# Patient Record
Sex: MALE | Race: BLACK OR AFRICAN AMERICAN | NOT HISPANIC OR LATINO | Employment: FULL TIME | URBAN - METROPOLITAN AREA
[De-identification: names, ages, dates, MRNs, and addresses within clinical notes are randomized per-mention and may not be internally consistent; named-entity substitution may affect disease eponyms.]

---

## 2021-10-29 ENCOUNTER — OFFICE VISIT (OUTPATIENT)
Dept: ORTHOPEDIC SURGERY | Facility: CLINIC | Age: 31
End: 2021-10-29

## 2021-10-29 VITALS — WEIGHT: 252 LBS | TEMPERATURE: 98 F | HEIGHT: 65 IN | BODY MASS INDEX: 41.99 KG/M2

## 2021-10-29 DIAGNOSIS — R52 PAIN: Primary | ICD-10-CM

## 2021-10-29 DIAGNOSIS — M76.62 ACHILLES TENDINITIS OF LEFT LOWER EXTREMITY: ICD-10-CM

## 2021-10-29 PROCEDURE — 73630 X-RAY EXAM OF FOOT: CPT | Performed by: ORTHOPAEDIC SURGERY

## 2021-10-29 PROCEDURE — 99203 OFFICE O/P NEW LOW 30 MIN: CPT | Performed by: ORTHOPAEDIC SURGERY

## 2021-10-29 RX ORDER — MELOXICAM 15 MG/1
15 TABLET ORAL DAILY PRN
Qty: 30 TABLET | Refills: 2 | Status: SHIPPED | OUTPATIENT
Start: 2021-10-29

## 2021-10-29 NOTE — PROGRESS NOTES
Patient: Jose J Lewis    YOB: 1990    Medical Record Number: [id number]    Chief Complaints: Left ankle pain    History of Present Illness:     31 y.o. male patient who presents for his left ankle.  It started bothering him a few months ago.  He does not recall any injury.  He especially notices the pain in the morning when he first gets up.  He also experiences pain when he is seated for prolonged period of time and then stands.  As he gets walking and moving it does seem to loosen up.  Localizes the pain to the back of his heel.  He was given a boot by the  at [place].  The boot does seem to help.    Allergies: Not on File    Home Medications:      Current Outpatient Medications:   •  meloxicam (MOBIC) 15 MG tablet, Take 1 tablet by mouth Daily As Needed for Moderate Pain . Take as directed with food., Disp: 30 tablet, Rfl: 2    History reviewed. No pertinent past medical history.    No past surgical history on file.    Social History     Occupational History   • Not on file   Tobacco Use   • Smoking status: Not on file   • Smokeless tobacco: Not on file   Substance and Sexual Activity   • Alcohol use: Not on file   • Drug use: Not on file   • Sexual activity: Not on file      Social History     Social History Narrative   • Not on file       History reviewed. No pertinent family history.    Review of Systems:      Constitutional: Denies fever, shaking or chills   Eyes: Denies change in visual acuity   HEENT: Denies nasal congestion or sore throat   Respiratory: Denies cough or shortness of breath   Cardiovascular: Denies chest pain or edema  Endocrine: Denies tremors, palpitations, intolerance of heat or cold, polyuria, polydipsia.  GI: Denies abdominal pain, nausea, vomiting, bloody stools or diarrhea  : Denies frequency, urgency, incontinence, retention, or nocturia.  Musculoskeletal: Denies numbness, tingling or loss of motor function except as above  Integument: Denies rash, lesion  "or ulceration   Neurologic: Denies headache or focal weakness, deficits  Heme: Denies spontaneous or excessive bleeding, epistaxis, hematuria, melena, fatigue, enlarged or tender lymph nodes.      All other pertinent positives and negatives as noted above in HPI.    Physical Exam: 31 y.o. male    Vitals:    10/29/21 0953   Temp: 98 °F (36.7 °C)   TempSrc: Temporal   Weight: 114 kg (252 lb)   Height: 165.1 cm (65\")       General:  Patient is awake and alert.  Appears in no acute distress or discomfort.    Psych:  Affect and demeanor are appropriate.    Eyes:  Conjunctiva and sclera appear grossly normal.  Eyes track well and EOM seem to be intact.    Ears:  No gross abnormalities.  Hearing adequate for the exam.    Cardiovascular:  Regular rate and rhythm.    Lungs:  Good chest expansion.  Breathing unlabored.    Lymph:  No palpable masses or adenopathy in the affected extremity    Extremities:  Left ankle:  Skin is benign.  He has flatfoot deformity which he says is his baseline.  No palpable masses or adenopathy.  No effusion.  No tenderness over the Achilles roughly 2 cm above the insertion.  No palpable defect or step-off.  Normal Baca test.  Full symmetric motion.  No instability.  Good strength with ankle inversion and eversion as well as ankle and toe plantarflexion and dorsiflexion.  Sensation is intact.  Brisk capillary refill in the toes with good skin turgor.         Radiology:   AP, oblique and lateral views of the left ankle are ordered and reviewed to evaluate his complaint.  No comparison films are available.  Pes planus noted.  No acute abnormalities, lesions, masses or other concerning findings.    Assessment/Plan: Left Achilles tendinitis    He can continue to use the boot for comfort.  He can wean out of this as his symptoms improve.  I do recommend that he start working aggressively on stretching exercises.  I demonstrated these for him.  With his permission, I am also going to contact the "  at [facility] so that she can work with him.  I suggested we try a topical anti-inflammatory and he was given a prescription for this.  I also gave him a prescription for meloxicam to take as needed.  Risk of this medicine were discussed.  He will follow-up with me as needed.    Negrito Lerma MD    10/29/2021    CC to Provider, No Known